# Patient Record
Sex: FEMALE | Employment: FULL TIME | URBAN - METROPOLITAN AREA
[De-identification: names, ages, dates, MRNs, and addresses within clinical notes are randomized per-mention and may not be internally consistent; named-entity substitution may affect disease eponyms.]

---

## 2020-12-19 ENCOUNTER — VIRTUAL VISIT (OUTPATIENT)
Dept: FAMILY MEDICINE | Facility: OTHER | Age: 35
End: 2020-12-19

## 2020-12-20 NOTE — PROGRESS NOTES
"Date: 2020 18:25:26  Clinician: Garrett Villegas  Clinician NPI: [id number]  Patient: Mary Kirkland  Patient : 1985  Patient Address: [address] [place] [address]  Patient Phone: [phone]  Visit Protocol: URI  Patient Summary:  Mary is a 34 year old ( : 1985 ) female who initiated a [facility] Visit for COVID-19 (Coronavirus) evaluation and screening. When asked the question \"Please sign me up to receive news, health information and promotions. \", Mary responded \"No\".    Mray states her symptoms started gradually 3-4 days ago.   Her symptoms consist of facial pain or pressure, myalgia, ear pain, a headache, a cough, nasal congestion, chills, malaise, a sore throat, and rhinitis. Mary also feels feverish.   Symptom details     Nasal secretions: The color of her mucus is yellow and clear.    Cough: Mary coughs almost every minute and her cough is more bothersome at night. Phlegm comes into her throat when she coughs. She believes her cough is caused by post-nasal drip. The color of the phlegm is white and clear.     Sore throat: Mary reports having moderate throat pain (4-6 on a 10 point pain scale), does not have exudate on her tonsils, and can swallow liquids. The lymph nodes in her neck are not enlarged. A rash has not appeared on the skin since the sore throat started.     Temperature: Her current temperature is 101.2 degrees Fahrenheit. Mary has had a temperature over 100 degrees Fahrenheit for 1-2 days.     Facial pain or pressure: The facial pain or pressure feels worse when bending over or leaning forward.     Headache: She states the headache is moderate (4-6 on a 10 point pain scale).      Mary denies having diarrhea, anosmia, wheezing, enlarged lymph nodes, nausea, teeth pain, ageusia, and vomiting. She also denies double sickening (worsening symptoms after initial improvement), having recent facial or sinus surgery in the past 60 days, and taking antibiotic " medication in the past month.   Precipitating events  Within the past week, Mary has not been exposed to someone with strep throat. She has not recently been exposed to someone with influenza. Mary has not been in close contact with any high risk individuals.   Pertinent COVID-19 (Coronavirus) information  Mary does not work or volunteer as healthcare worker or a . In the past 14 days, Mary has not worked or volunteered at a healthcare facility or group living setting.   In the past 14 days, she also has not lived in a congregate living setting.   Mary has had a close contact with a laboratory-confirmed COVID-19 patient within 14 days of symptom onset. She was not exposed at her work. She does not know when she was exposed to the laboratory-confirmed COVID-19 patient.   Additional information about contact with COVID-19 (Coronavirus) patient as reported by the patient (free text): My  was exposed to a covid positive person and then he exposed me. We both tested positive for covid today    Mary has been tested for COVID-19.      Date(s) of her COVID-19 test as reported by the patient (free text): 12/18/2020       Result of COVID-19 test as reported by the patient (free text): Positive       Type of test as reported by the patient (free text): Saliva        Triage Point(s) temporarily suspended for COVID-19 (Coronavirus) screening  Mary reported the following symptoms/conditions. These are protocol referral points that have temporarily been removed for purposes of COVID-19 (Coronavirus) screening.   Difficulty breathing even when resting and can only speak in phrase(s)   Pertinent medical history  Mary had 1 sinus infection within the past year.   She has not been told by her provider to avoid NSAIDs.   Mary does not get yeast infections when she takes antibiotics.   Mary does not have diabetes. She denies having immunosuppressive conditions (e.g., chemotherapy, HIV, organ  transplant, long-term use of steroids or other immunosuppressive medications, splenectomy). She denies having congestive heart failure and severe COPD. She does not have asthma.   Mary does not need a return to work/school note.   Mary smokes or uses smokeless tobacco.   She denies pregnancy and denies breastfeeding. She has menstruated in the past month.   Weight: 240 lbs    MEDICATIONS: No current medications, ALLERGIES: NKDA  Clinician Response:  Dear Mary,   Your symptoms show that you may have coronavirus (COVID-19). This illness can cause fever, cough and trouble breathing. Many people get a mild case and get better on their own. Some people can get very sick.  What should I do?  We would like to test you for this virus.   1. Please call [phone] to schedule your visit. Explain that you were referred by [facility] to have a COVID-19 test. Be ready to share your [facility] visit ID number.  * If you need to schedule in [facility] please call [phone] or for [place] employees please call [phone].  * If you need to schedule in the [place] area please call [phone]. [place] employees call [phone].  The following will serve as your written order for this COVID Test, ordered by me, for the indication of suspected COVID [Z20.828]: The test will be ordered in [employer], our electronic health record, after you are scheduled. It will show as ordered and authorized by Frank Hernandez MD.  Order: COVID-19 (Coronavirus) PCR for SYMPTOMATIC testing from [facility].   2. When it's time for your COVID test:  Stay at least 6 feet away from others. (If someone will drive you to your test, stay in the backseat, as far away from the  as you can.)   Cover your mouth and nose with a mask, tissue or washcloth.  Go straight to the testing site. Don't make any stops on the way there or back.      3.Starting now: Stay home and away from others (self-isolate) until:   You've had no fever---and no medicine that reduces  "fever---for one full day (24 hours). And...   Your other symptoms have gotten better. For example, your cough or breathing has improved. And...   At least 10 days have passed since your symptoms started.       During this time, don't leave the house except for testing or medical care.   Stay in your own room, even for meals. Use your own bathroom if you can.   Stay away from others in your home. No hugging, kissing or shaking hands. No visitors.  Don't go to work, school or anywhere else.    Clean \"high touch\" surfaces often (doorknobs, counters, handles, etc.). Use a household cleaning spray or wipes. You'll find a full list of  on the EPA website: www.epa.gov/pesticide-registration/list-n-disinfectants-use-against-sars-cov-2.   Cover your mouth and nose with a mask, tissue or washcloth to avoid spreading germs.  Wash your hands and face often. Use soap and water.  Caregivers in these groups are at risk for severe illness due to COVID-19:  o People 65 years and older  o People who live in a nursing home or long-term care facility  o People with chronic disease (lung, heart, cancer, diabetes, kidney, liver, immunologic)  o People who have a weakened immune system, including those who:   Are in cancer treatment  Take medicine that weakens the immune system, such as corticosteroids  Had a bone marrow or organ transplant  Have an immune deficiency  Have poorly controlled HIV or AIDS  Are obese (body mass index of 40 or higher)  Smoke regularly   o Caregivers should wear gloves while washing dishes, handling laundry and cleaning bedrooms and bathrooms.  o Use caution when washing and drying laundry: Don't shake dirty laundry, and use the warmest water setting that you can.  o For more tips, go to www.cdc.gov/coronavirus/2019-ncov/downloads/10Things.pdf.    4.Sign up for [employer]. We know it's scary to hear that you might have COVID-19. We want to track your symptoms to make sure you're okay over the next 2 " weeks. Please look for an email from [employer]---this is a free, online program that we'll use to keep in touch. To sign up, follow the link in the email. Learn more at [email or web address]  How can I take care of myself?   Get lots of rest. Drink extra fluids (unless a doctor has told you not to).   Take Tylenol (acetaminophen) for fever or pain. If you have liver or kidney problems, ask your family doctor if it's okay to take Tylenol.   Adults can take either:    650 mg (two 325 mg pills) every 4 to 6 hours, or...   1,000 mg (two 500 mg pills) every 8 hours as needed.    Note: Don't take more than 3,000 mg in one day. Acetaminophen is found in many medicines (both prescribed and over-the-counter medicines). Read all labels to be sure you don't take too much.   For children, check the Tylenol bottle for the right dose. The dose is based on the child's age or weight.    If you have other health problems (like cancer, heart failure, an organ transplant or severe kidney disease): Call your specialty clinic if you don't feel better in the next 2 days.       Know when to call 911. Emergency warning signs include:    Trouble breathing or shortness of breath Pain or pressure in the chest that doesn't go away Feeling confused like you haven't felt before, or not being able to wake up Bluish-colored lips or face.  Where can I get more information?   [facility] -- About COVID-19: [email or web address]   CDC -- What to Do If You're Sick: www.cdc.gov/coronavirus/2019-ncov/about/steps-when-sick.html   CDC -- Ending Home Isolation: www.cdc.gov/coronavirus/2019-ncov/hcp/disposition-in-home-patients.html   CDC -- Caring for Someone: www.cdc.gov/coronavirus/2019-ncov/if-you-are-sick/care-for-someone.html   [facility] -- Interim Guidance for Hospital Discharge to Home: www.health.[facility].mn./diseases/coronavirus/hcp/hospdischarge.pdf   [facility] clinical trials (COVID-19 research studies):  clinicalaffairs.[facility]-clinical-trials    Below are the COVID-19 hotlines at the [place] Department of Health ([facility]). Interpreters are available.    For health questions: Call 465-538-7757 or 1-954.376.2991 (7 a.m. to 7 p.m.) For questions about schools and childcare: Call 474-759-3753 or 1-566.207.8491 (7 a.m. to 7 p.m.)    Diagnosis: Contact with and (suspected) exposure to other viral communicable diseases  Diagnosis ICD: Z20.828

## 2021-05-01 ENCOUNTER — HEALTH MAINTENANCE LETTER (OUTPATIENT)
Age: 36
End: 2021-05-01

## 2021-10-11 ENCOUNTER — HEALTH MAINTENANCE LETTER (OUTPATIENT)
Age: 36
End: 2021-10-11

## 2022-03-12 ENCOUNTER — HOSPITAL ENCOUNTER (EMERGENCY)
Facility: CLINIC | Age: 37
Discharge: HOME | End: 2022-03-12
Attending: NURSE PRACTITIONER | Admitting: NURSE PRACTITIONER
Payer: OTHER GOVERNMENT

## 2022-03-12 ENCOUNTER — APPOINTMENT (OUTPATIENT)
Dept: GENERAL RADIOLOGY | Facility: CLINIC | Age: 37
End: 2022-03-12
Attending: NURSE PRACTITIONER
Payer: OTHER GOVERNMENT

## 2022-03-12 VITALS
OXYGEN SATURATION: 100 % | RESPIRATION RATE: 16 BRPM | HEART RATE: 93 BPM | TEMPERATURE: 97.9 F | DIASTOLIC BLOOD PRESSURE: 51 MMHG | SYSTOLIC BLOOD PRESSURE: 150 MMHG
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
## 2022-03-12 DIAGNOSIS — S82.899A ANKLE FRACTURE: ICD-10-CM

## 2022-03-12 PROBLEM — M79.673 PAIN OF FOOT: Status: ACTIVE | Noted: 2022-03-12

## 2022-03-12 PROBLEM — M25.30 INSTABILITY OF JOINT: Status: ACTIVE | Noted: 2022-03-12

## 2022-03-12 PROCEDURE — 250N000013 HC RX MED GY IP 250 OP 250 PS 637: Performed by: NURSE PRACTITIONER

## 2022-03-12 PROCEDURE — 27767 CLTX POST ANKLE FX: CPT | Mod: RT

## 2022-03-12 PROCEDURE — 99284 EMERGENCY DEPT VISIT MOD MDM: CPT | Mod: 25

## 2022-03-12 PROCEDURE — 73610 X-RAY EXAM OF ANKLE: CPT | Mod: RT

## 2022-03-12 RX ORDER — HYDROCODONE BITARTRATE AND ACETAMINOPHEN 5; 325 MG/1; MG/1
1 TABLET ORAL ONCE
Status: COMPLETED | OUTPATIENT
Start: 2022-03-12 | End: 2022-03-12

## 2022-03-12 RX ORDER — IBUPROFEN 600 MG/1
600 TABLET, FILM COATED ORAL ONCE
Status: COMPLETED | OUTPATIENT
Start: 2022-03-12 | End: 2022-03-12

## 2022-03-12 RX ORDER — HYDROCODONE BITARTRATE AND ACETAMINOPHEN 5; 325 MG/1; MG/1
1 TABLET ORAL EVERY 8 HOURS PRN
Qty: 6 TABLET | Refills: 0 | Status: SHIPPED | OUTPATIENT
Start: 2022-03-12 | End: 2022-03-15

## 2022-03-12 RX ADMIN — HYDROCODONE BITARTRATE AND ACETAMINOPHEN 1 TABLET: 5; 325 TABLET ORAL at 22:02

## 2022-03-12 RX ADMIN — IBUPROFEN 600 MG: 600 TABLET ORAL at 21:20

## 2022-03-12 ASSESSMENT — ENCOUNTER SYMPTOMS
WOUND: 0
JOINT SWELLING: 1
WEAKNESS: 0
ARTHRALGIAS: 1
NUMBNESS: 0

## 2022-03-13 NOTE — ED PROVIDER NOTES
History   Chief Complaint:  Ankle Pain     The history is provided by the patient.      Mary Kirkland is a 36 year old female who presents with ankle pain. Patient slipped on ice 45 minutes ago and rolled her right ankle. She complains of most pain of the medial aspect of her ankle. She notes pain of the top of her foot and some swelling. She describes it as a throbbing pain. She was not able to walk right away. She has sprained her ankle in the past.     Review of Systems   Musculoskeletal: Positive for arthralgias (right ankle), gait problem and joint swelling.   Skin: Negative for wound.   Neurological: Negative for weakness and numbness.   All other systems reviewed and are negative.    Allergies:  The patient has no known allergies.     Medications:  The patient denies use of medications     Past Medical History:     Goiter  Mood and affect disturbance  Cervical intraepithelial neoplasia I    Past Surgical History:    Colposcopy   Remove replace antibiot beads knee     Family History:    Mother: depression, anxiety   Sister: anxiety, bipolar     Social History:  Presents to ED with visitor     Physical Exam     Patient Vitals for the past 24 hrs:   BP Temp Pulse Resp SpO2   03/12/22 2110 (!) 150/51 97.9  F (36.6  C) 93 16 100 %       Physical Exam  General: Alert, Mild  discomfort, well kept   HENT:  Normal voice, No lymphadenopathy  Eyes:  The pupils are equal, round, and reactive to light, Conjunctiva normal, No scleral icterus   Neck:  Normal range of motion  CV:  Normal Pulses  Resp:  Non-labored, No cough  MS:  Normal muscular tone, moves all extremities, Right anklewith tenderness, mild swelling, no contusion, no hematoma, no obvious deformity and good distal circulation and sensation  Skin:  No rash or acute skin lesions noted  Neuro: Speech is normal and fluent  Psych:  Awake. Alert.  Normal affect.  Appropriate interactions. Good eye contact    Emergency Department Course     Imaging:  XR Ankle Right  G/E 3 Views   Final Result   IMPRESSION:   1.  Oblique nondisplaced fracture of the right tibia posterior malleolus.   2.  Normal joint spacing and alignment.   3.  Small calcaneus enthesophytes.      Report per radiology     Emergency Department Course:     Reviewed:  I reviewed nursing notes, vitals, past medical history and Care Everywhere    Assessments:  2114 I obtained history and examined the patient as noted above.   2137 Rechecked the patient and explained findings. Seen in conjunction with Dr. Chauhan.  Cam boot will be placed.    Interventions:  2120 Ibuprofen 600 mg PO  2202 Norco 1 tablet PO    Disposition:  The patient was discharged to home.     Impression & Plan       Medical Decision Making:  Mary Kirkland is a 36 year old female who presented for injury after falling on Right ankle Concerned due to discomfort she presented for evaluation. Xray obtained and is positive for fracture as above. No neurovascular compromise. Ibuprofen and Ice for discomfort. RX for Norco given. Follow up with orthopedics within 7 days.  Will call tomorrow to schedule.  Immediate return to the ED if develops numbness, weakness, tingling, discoloration, or for other concerns.     Diagnosis:    ICD-10-CM    1. Fall, initial encounter  W19.XXXA    2. Ankle fracture  S82.899A        Discharge Medications:  New Prescriptions    HYDROCODONE-ACETAMINOPHEN (NORCO) 5-325 MG TABLET    Take 1 tablet by mouth every 8 hours as needed for severe pain       Scribe Disclosure:  GRECIA, Durga Calderón, am serving as a scribe at 9:11 PM on 3/12/2022 to document services personally performed by Kris Lopes APRN CNP based on my observations and the provider's statements to me.            Kris Lopes APRN CNP  03/12/22 [id number]

## 2022-03-13 NOTE — DISCHARGE INSTRUCTIONS
Ibuprofen or Naproxen and/or Tylenol scheduled for the next 3-5 days. 600 mg (three tabs) ibuprofen, 440 mg (two tabs) Naproxen, 650-1000 mg of Tylenol.  Ibuprofen and Tylenol are every 6 hours Naproxen is 2 times daily. Follow directions.  Ice to area.  I recommend ice in the first 24-48 hours. Apply ice for no more than 20 minutes at a time with at least an hour break in between applications.  3-5 times daily is recommended.

## 2022-05-14 ENCOUNTER — HEALTH MAINTENANCE LETTER (OUTPATIENT)
Age: 37
End: 2022-05-14

## 2022-09-03 ENCOUNTER — HEALTH MAINTENANCE LETTER (OUTPATIENT)
Age: 37
End: 2022-09-03

## 2023-01-15 ENCOUNTER — HEALTH MAINTENANCE LETTER (OUTPATIENT)
Age: 38
End: 2023-01-15

## 2024-02-17 ENCOUNTER — HEALTH MAINTENANCE LETTER (OUTPATIENT)
Age: 39
End: 2024-02-17

## 2025-01-11 ENCOUNTER — HEALTH MAINTENANCE LETTER (OUTPATIENT)
Age: 40
End: 2025-01-11

## 2025-02-07 ENCOUNTER — ANCILLARY PROCEDURE (OUTPATIENT)
Dept: MAMMOGRAPHY | Facility: CLINIC | Age: 40
End: 2025-02-07

## 2025-02-07 DIAGNOSIS — Z12.31 VISIT FOR SCREENING MAMMOGRAM: ICD-10-CM

## 2025-03-08 ENCOUNTER — HEALTH MAINTENANCE LETTER (OUTPATIENT)
Age: 40
End: 2025-03-08